# Patient Record
Sex: FEMALE | URBAN - NONMETROPOLITAN AREA
[De-identification: names, ages, dates, MRNs, and addresses within clinical notes are randomized per-mention and may not be internally consistent; named-entity substitution may affect disease eponyms.]

---

## 2024-02-14 ENCOUNTER — APPOINTMENT (RX ONLY)
Dept: URBAN - NONMETROPOLITAN AREA CLINIC 13 | Facility: CLINIC | Age: 64
Setting detail: DERMATOLOGY
End: 2024-02-14

## 2024-02-14 DIAGNOSIS — L92.0 GRANULOMA ANNULARE: ICD-10-CM | Status: INADEQUATELY CONTROLLED

## 2024-02-14 PROBLEM — L30.9 DERMATITIS, UNSPECIFIED: Status: ACTIVE | Noted: 2024-02-14

## 2024-02-14 PROCEDURE — 11104 PUNCH BX SKIN SINGLE LESION: CPT

## 2024-02-14 PROCEDURE — ? BIOPSY BY PUNCH METHOD

## 2024-02-14 PROCEDURE — ? COUNSELING

## 2024-02-14 ASSESSMENT — LOCATION SIMPLE DESCRIPTION DERM: LOCATION SIMPLE: RIGHT FOREARM

## 2024-02-14 ASSESSMENT — BSA RASH: BSA RASH: 1

## 2024-02-14 ASSESSMENT — LOCATION DETAILED DESCRIPTION DERM: LOCATION DETAILED: RIGHT PROXIMAL DORSAL FOREARM

## 2024-02-14 ASSESSMENT — LOCATION ZONE DERM: LOCATION ZONE: ARM

## 2024-02-14 ASSESSMENT — SEVERITY ASSESSMENT: SEVERITY: MILD

## 2024-02-14 NOTE — PROCEDURE: BIOPSY BY PUNCH METHOD

## 2024-02-14 NOTE — PROCEDURE: COUNSELING
Patient Specific Counseling (Will Not Stick From Patient To Patient): Lesions have not responded much to TMC cream, but are asymptomatic. Has developed a new lesions as well. Pt does have hypothyroidism and takes levothyroxine.
Detail Level: Zone

## 2024-02-14 NOTE — HPI: RASH
What Type Of Note Output Would You Prefer (Optional)?: Standard Output
How Severe Is Your Rash?: mild
Is This A New Presentation, Or A Follow-Up?: Rash
Additional History: Pt was given TMC and betamethasone. Pt states it has not helped. Pt applying lamisil now.

## 2024-02-29 ENCOUNTER — APPOINTMENT (RX ONLY)
Dept: URBAN - NONMETROPOLITAN AREA CLINIC 13 | Facility: CLINIC | Age: 64
Setting detail: DERMATOLOGY
End: 2024-02-29

## 2024-02-29 DIAGNOSIS — L30.8 OTHER SPECIFIED DERMATITIS: ICD-10-CM | Status: STABLE

## 2024-02-29 DIAGNOSIS — Z48.02 ENCOUNTER FOR REMOVAL OF SUTURES: ICD-10-CM

## 2024-02-29 PROCEDURE — ? PRESCRIPTION

## 2024-02-29 PROCEDURE — ? PRESCRIPTION MEDICATION MANAGEMENT

## 2024-02-29 PROCEDURE — 99213 OFFICE O/P EST LOW 20 MIN: CPT

## 2024-02-29 PROCEDURE — ? SUTURE REMOVAL (GLOBAL PERIOD)

## 2024-02-29 RX ORDER — CLOBETASOL PROPIONATE 0.5 MG/G
CREAM TOPICAL BID
Qty: 60 | Refills: 3 | Status: ERX | COMMUNITY
Start: 2024-02-29

## 2024-02-29 RX ADMIN — CLOBETASOL PROPIONATE: 0.5 CREAM TOPICAL at 00:00

## 2024-02-29 ASSESSMENT — ITCH NUMERIC RATING SCALE: HOW SEVERE IS YOUR ITCHING?: 4

## 2024-02-29 ASSESSMENT — BSA RASH: BSA RASH: 1

## 2024-02-29 ASSESSMENT — LOCATION SIMPLE DESCRIPTION DERM: LOCATION SIMPLE: RIGHT FOREARM

## 2024-02-29 ASSESSMENT — SEVERITY ASSESSMENT: SEVERITY: MILD

## 2024-02-29 ASSESSMENT — LOCATION DETAILED DESCRIPTION DERM: LOCATION DETAILED: RIGHT PROXIMAL DORSAL FOREARM

## 2024-02-29 ASSESSMENT — LOCATION ZONE DERM: LOCATION ZONE: ARM

## 2024-02-29 ASSESSMENT — PAIN INTENSITY VAS: HOW INTENSE IS YOUR PAIN 0 BEING NO PAIN, 10 BEING THE MOST SEVERE PAIN POSSIBLE?: NO PAIN

## 2024-02-29 NOTE — PROCEDURE: SUTURE REMOVAL (GLOBAL PERIOD)
Detail Level: Simple
Add 59231 Cpt? (Important Note: In 2017 The Use Of 18743 Is Being Tracked By Cms To Determine Future Global Period Reimbursement For Global Periods): no

## 2024-02-29 NOTE — PROCEDURE: PRESCRIPTION MEDICATION MANAGEMENT
Render In Strict Bullet Format?: No
Initiate Treatment: Clobetesol 0.05% cream BID PRN
Plan: Path shows Interface dermatitis suggesting drug rxn or autoimmune. Pt does have hypothyroid. Recommend asking pharmacist (in [place]) about any drug or  changes approx 1 yr ago prior to rash onset. She did change from NP thyroid to levothyroxine, just not sure of timing. She has only recently started the alendronate so that's not the cause. Recommend increasing topical steroid strength for now.
Detail Level: Simple